# Patient Record
Sex: MALE | Race: WHITE | Employment: OTHER | URBAN - METROPOLITAN AREA
[De-identification: names, ages, dates, MRNs, and addresses within clinical notes are randomized per-mention and may not be internally consistent; named-entity substitution may affect disease eponyms.]

---

## 2020-05-29 ENCOUNTER — OFFICE VISIT (OUTPATIENT)
Dept: URGENT CARE | Facility: URGENT CARE | Age: 85
End: 2020-05-29
Payer: COMMERCIAL

## 2020-05-29 VITALS
OXYGEN SATURATION: 98 % | WEIGHT: 187 LBS | TEMPERATURE: 97.2 F | RESPIRATION RATE: 16 BRPM | HEART RATE: 105 BPM | DIASTOLIC BLOOD PRESSURE: 66 MMHG | SYSTOLIC BLOOD PRESSURE: 99 MMHG

## 2020-05-29 DIAGNOSIS — Z92.29 HISTORY OF COUMADIN THERAPY: Primary | ICD-10-CM

## 2020-05-29 DIAGNOSIS — Z96.643 STATUS POST BILATERAL HIP REPLACEMENTS: ICD-10-CM

## 2020-05-29 DIAGNOSIS — M79.89 LEFT LEG SWELLING: ICD-10-CM

## 2020-05-29 DIAGNOSIS — S89.92XA INJURY OF LOWER EXTREMITY, LEFT, INITIAL ENCOUNTER: ICD-10-CM

## 2020-05-29 PROCEDURE — 99204 OFFICE O/P NEW MOD 45 MIN: CPT | Performed by: PHYSICIAN ASSISTANT

## 2020-05-29 RX ORDER — ATORVASTATIN CALCIUM 80 MG/1
80 TABLET, FILM COATED ORAL
COMMUNITY
Start: 2020-02-27

## 2020-05-29 RX ORDER — METOPROLOL SUCCINATE 100 MG/1
100 TABLET, EXTENDED RELEASE ORAL DAILY
COMMUNITY
Start: 2020-02-27

## 2020-05-29 RX ORDER — LISINOPRIL 10 MG/1
10 TABLET ORAL
COMMUNITY
Start: 2020-02-27

## 2020-05-29 RX ORDER — AMLODIPINE BESYLATE 5 MG/1
5 TABLET ORAL
COMMUNITY
Start: 2020-02-27

## 2020-05-29 RX ORDER — LATANOPROST 50 UG/ML
SOLUTION/ DROPS OPHTHALMIC
COMMUNITY
Start: 2020-04-11

## 2020-05-29 RX ORDER — ASPIRIN 81 MG/1
81 TABLET ORAL DAILY
COMMUNITY

## 2020-05-29 RX ORDER — CHLORAL HYDRATE 500 MG
1000 CAPSULE ORAL
COMMUNITY
Start: 2019-03-06

## 2020-05-29 RX ORDER — FUROSEMIDE 20 MG
20 TABLET ORAL
COMMUNITY
Start: 2020-02-27

## 2020-05-29 RX ORDER — WARFARIN SODIUM 5 MG/1
TABLET ORAL
COMMUNITY
Start: 2020-03-18

## 2020-05-29 NOTE — PROGRESS NOTES
S: 83 yo presents for persistent left lower leg swelling and ecchymosis.  1 week ago he [place] the cabin and was on the roof top placing solar panels.  He did scrape up his shins from kneeling and has multiple abrasions over both shins.  After he had loaded his 4 booker on a trailer to go home he climbed up on the 4 booker to remove the key.  As he was getting off his left foot caught between the rail of the 4 booker and the trailer and the right foot was on the ground.  It took 1-1/2 hours to free his leg.  He had 12 inch boots on.  He denies any significant pain or numbness or tingling.  No fever.  INR 5/21/2020 was 2.9.  2 blisters near the left posterior ankle popped yesterday and he has had some slight continuous oozing.  He is concerned as there is still some much swelling.    Tdap 9/2/2014    Allergies-hydrochlorothiazide and wasp venom protein    Past medical history-chronic A. fib with target INR range of 2-3.,  Bilateral hip replacements, PAD, hypertension, hyperlipidemia.  Leg stent placement in February 2009.  Also has had a prior cardiac LAD stent placement.    Social-former smoker    Family medical history-siblings with hypertension    ROS:  CONSTITUTIONAL: Negative for fatigue or fever.  EYES: Negative for eye problems.  ENT: neg for ST.  RESP: neg for cough.  CV: Negative for chest pains.  GI: Negative for vomiting.  MUSCULOSKELETAL:  As above.  NEUROLOGIC: Negative for headaches.  SKIN: Negative for rash.  PSYCH: Normal mentation for age.    OBJECTIVE:    ICD-10-CM    1. History of Coumadin therapy  Z92.29    2. Injury of lower extremity, left, initial encounter  S89.92XA    3. Left leg swelling  M79.89    4. Status post bilateral hip replacements  Z96.643        GENERAL APPEARANCE: Healthy, alert and no distress.  EYES:Conjunctiva/sclera clear.  NECK: Moving head neck freely   RESP: Breathing comfortably   NEURO: Awake, alert    Circulation-palpable dorsalis pedis pulse left foot, faint due to  swelling/edema.  Sensation to soft touch intact bilateral lower extremities  SKIN: Both shins are with multiple black excoriated abrasions.  Has some slight serosanguineous weeping of 2 blisters posterior ankle that popped yesterday.  I do not see any pustule drainage anywhere.  No obvious infection.  Left leg-left calf is very swollen compared to the right.  I would say his left lower extremity is about twice the size of his right lower extremity.  Very tense to palpation.  The tenseness can even be palpated into the posterior thigh, midway up.     Negative Homans.        ASSESSMENT:       ICD-10-CM    1. History of Coumadin therapy  Z92.29    2. Injury of lower extremity, left, initial encounter  S89.92XA    3. Left leg swelling  M79.89    4. Status post bilateral hip replacements  Z96.643          PLAN: No obvious infection currently noted although he is at increased risk with his comorbidities.With his comorbidities including Coumadin treatment and bilateral hip replacements I feel he should have further evaluation than can be done here.  I cannot rule out DVT or compartment syndrome.  We have no advanced imaging here such as ultrasound.  He is 1 week out and his calf is still extremely swollen.  Likely x-rays will also be done in addition.  Patient will go to [facility] for further evaluation.  I have discussed clinical findings with patient.  All questions are answered, patient indicates understanding of these issues and is in agreement with plan.   Patient care instructions are discussed/given at the end of visit.   Patient states he likely will go to [facility] as he is familiar with it.    Lilia Escobar PA-C

## 2020-05-29 NOTE — PATIENT INSTRUCTIONS
Still with very tense, swollen left lower extremity after an injury 1 week ago.  On Coumadin therapy for chronic A. fib.  Unable to rule out blood clot or compartment syndrome as no ultrasound imaging available. To ER for evaluation.

## 2021-05-18 ENCOUNTER — OFFICE VISIT (OUTPATIENT)
Dept: URGENT CARE | Facility: URGENT CARE | Age: 86
End: 2021-05-18
Payer: COMMERCIAL

## 2021-05-18 VITALS
TEMPERATURE: 98.1 F | OXYGEN SATURATION: 96 % | DIASTOLIC BLOOD PRESSURE: 60 MMHG | SYSTOLIC BLOOD PRESSURE: 96 MMHG | RESPIRATION RATE: 20 BRPM | HEART RATE: 84 BPM | WEIGHT: 190.2 LBS

## 2021-05-18 DIAGNOSIS — I73.9 PERIPHERAL ARTERIAL DISEASE (H): ICD-10-CM

## 2021-05-18 DIAGNOSIS — I95.9 HYPOTENSION, UNSPECIFIED HYPOTENSION TYPE: ICD-10-CM

## 2021-05-18 DIAGNOSIS — L95.9 VASCULITIS OF SKIN: ICD-10-CM

## 2021-05-18 DIAGNOSIS — Z79.01 LONG TERM CURRENT USE OF ANTICOAGULANT THERAPY: ICD-10-CM

## 2021-05-18 DIAGNOSIS — M79.89 RIGHT LEG SWELLING: Primary | ICD-10-CM

## 2021-05-18 PROCEDURE — 99215 OFFICE O/P EST HI 40 MIN: CPT | Performed by: PHYSICIAN ASSISTANT

## 2021-05-18 RX ORDER — NIACIN 1000 MG/1
1000 TABLET, EXTENDED RELEASE ORAL 2 TIMES DAILY
COMMUNITY

## 2021-05-18 RX ORDER — NITROGLYCERIN 0.4 MG/1
0.4 TABLET SUBLINGUAL
COMMUNITY
Start: 2020-06-02

## 2021-05-18 RX ORDER — MULTIVIT WITH MINERALS/LUTEIN
1 TABLET ORAL DAILY
COMMUNITY

## 2021-05-18 NOTE — PATIENT INSTRUCTIONS
History of cardiac and iliac/femoral stents.  On Coumadin.  Right lower extremity swelling and weeping for 1 week.  Not painful.  Unable to palpate pedal pulses on the right as leg is very taut.  No advanced imaging or labs available.  ? Cellulitis versus third spacing versus heart failure.  To ER for further evaluation.

## 2021-05-18 NOTE — PROGRESS NOTES
Chief Complaint   Patient presents with     Derm Problem     Redness, weeping from right lower leg x1 week. On Warfarin              Differential Diagnosis:  Cellulitis, coagulation complication, CHF, liver failure, third spacing, vasculitis        ASSESSMENT:     ICD-10-CM    1. 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 Peripheral arterial disease (H)  I73.9    4. Hypotension, unspecified hypotension type  I95.9    5. Long term current use of anticoagulant therapy  Z79.01          PLAN:History of cardiac and iliac/femoral stents.  On Coumadin.  Right lower extremity swelling and weeping for 1 week.  Not painful.  Unable to palpate pedal pulses on the right as leg is very taut.  No advanced imaging or labs available.  I do not think this is cellulitis.  I feel the leg is so swollen that the skin broke open and it is weeping.  He also has the same rash on the left leg but it is not weeping at this point.  Consider cellulitis, coagulation complication, CHF, liver failure, third spacing, vasculitis.  To ER for evaluation as we are limited on imaging and labs.  I have discussed clinical findings with patient.  All questions are answered, patient indicates understanding of these issues and is in agreement with plan.   Patient care instructions are discussed/given at the end of visit.     Lilia Escobar PA-C      SUBJECTIVE:  85-year-old male with history of cardiac and iliac/femoral stents presents for evaluation of right leg swelling and weeping.  He is here with his wife.  He also has a similar rash on his left leg but denies any swelling or weeping of the left leg.  He is on Coumadin.  He was therapeutic on 5 6/21 with INR of 2.2.  No fever.  No new talk the calls.  He has had some issues since she injured his left leg last summer when it got caught inside a trailer.  At that time he states he required some vitamin K.  He denies any fracture to the leg.  Has been using Myriam cream for lower  extremity leg dryness.  No fever.  He denies any chest pain.  No shortness of breath although his wife states that his breathing does seem more labored to her.  No dizziness.  He did note that his blood pressure was low here today and he states he normally runs 120/70.    Allergies   Allergen Reactions     Wasp Venom Protein Anaphylaxis     Hydrochlorothiazide Other (See Comments)     Mild hyponatremia, creatinine elevation.       History reviewed. No pertinent past medical history.    amLODIPine (NORVASC) 5 MG tablet, Take 5 mg by mouth  aspirin 81 MG EC tablet, Take 81 mg by mouth daily  atorvastatin (LIPITOR) 80 MG tablet, Take 80 mg by mouth  furosemide (LASIX) 20 MG tablet, Take 20 mg by mouth  lisinopril (ZESTRIL) 10 MG tablet, Take 10 mg by mouth  metoprolol succinate ER (TOPROL-XL) 100 MG 24 hr tablet, Take 100 mg by mouth daily   multivitamin (CENTRUM SILVER) tablet, Take 1 tablet by mouth daily  niacin ER (NIASPAN) 1000 MG CR tablet, Take 1,000 mg by mouth 2 times daily  nitroGLYcerin (NITROSTAT) 0.4 MG sublingual tablet, Place 0.4 mg under the tongue  Omega-3 1000 MG capsule, Take 1,000 mg by mouth  warfarin ANTICOAGULANT (COUMADIN) 5 MG tablet, Take by mouth: 7.5 mg (5 mg x 1.5) every Wed, Sat; 5 mg (5 mg x 1) all other days  latanoprost (XALATAN) 0.005 % ophthalmic solution, INSTILL ONE DROP IN BOTH EYES NIGHTLY AT BEDTIME    No current facility-administered medications on file prior to visit.       Social History     Tobacco Use     Smoking status: Former Smoker     Types: Cigarettes     Quit date: 2021     Years since quittin.0     Smokeless tobacco: Never Used   Substance Use Topics     Alcohol use: Not on file       ROS:  CONSTITUTIONAL: Negative for fatigue or fever.  EYES: Negative for eye problems.  ENT: Negative for sore throat   RESP: Negative for cough   CV: Negative for chest pains.  GI: Negative for vomiting.  MUSCULOSKELETAL: As above  NEUROLOGIC: Negative for headaches.  SKIN: As  above   PSYCH: Normal mentation for age.    OBJECTIVE:  BP 96/60   Pulse 84   Temp 98.1  F (36.7  C) (Oral)   Resp 20   Wt 86.3 kg (190 lb 3.2 oz)   SpO2 96%   GENERAL APPEARANCE: Healthy, alert and no distress.  EYES:Conjunctiva/sclera clear.  NECK: Moving head neck freely   RESP: Breathing comfortably   NEURO: Awake, alert    SKIN: Both lower legs are with red macular shin rash with some areas of confluency.  Right lower extremity has open areas with clear fluid weeping.  The right lower extremity is swollen and very taut to palpation.  Even the foot feels taut and I am unable to palpate the dorsalis pedis or posterior tibialis pulse.      DAVID AmesC